# Patient Record
Sex: MALE | Race: WHITE | Employment: FULL TIME | URBAN - METROPOLITAN AREA
[De-identification: names, ages, dates, MRNs, and addresses within clinical notes are randomized per-mention and may not be internally consistent; named-entity substitution may affect disease eponyms.]

---

## 2017-02-21 ENCOUNTER — HOSPITAL ENCOUNTER (EMERGENCY)
Age: 27
Discharge: HOME | End: 2017-02-22
Attending: EMERGENCY MEDICINE
Payer: SELF-PAY
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
## 2017-02-21 PROCEDURE — 99283 EMERGENCY DEPT VISIT LOW MDM: CPT

## 2017-02-22 VITALS
OXYGEN SATURATION: 98 % | DIASTOLIC BLOOD PRESSURE: 78 MMHG | RESPIRATION RATE: 14 BRPM | HEART RATE: 76 BPM | SYSTOLIC BLOOD PRESSURE: 114 MMHG | TEMPERATURE: 98 F

## 2017-02-22 RX ORDER — KETOROLAC TROMETHAMINE 30 MG/ML
60 INJECTION, SOLUTION INTRAMUSCULAR; INTRAVENOUS
Status: DISCONTINUED | OUTPATIENT
Start: 2017-02-22 | End: 2017-02-22 | Stop reason: HOSPADM
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
## 2019-07-11 ENCOUNTER — HOSPITAL ENCOUNTER (EMERGENCY)
Age: 29
Discharge: HOME | End: 2019-07-12
Attending: EMERGENCY MEDICINE
Payer: SELF-PAY

## 2019-07-11 VITALS
OXYGEN SATURATION: 95 % | BODY MASS INDEX: 24.25 KG/M2 | TEMPERATURE: 98.2 F | RESPIRATION RATE: 16 BRPM | SYSTOLIC BLOOD PRESSURE: 140 MMHG | HEART RATE: 72 BPM | WEIGHT: 160 LBS | HEIGHT: 68 IN | DIASTOLIC BLOOD PRESSURE: 79 MMHG

## 2019-07-11 DIAGNOSIS — S61.217A LACERATION OF LEFT LITTLE FINGER WITHOUT FOREIGN BODY WITHOUT DAMAGE TO NAIL, INITIAL ENCOUNTER: Primary | ICD-10-CM

## 2019-07-11 PROCEDURE — 99282 EMERGENCY DEPT VISIT SF MDM: CPT

## 2019-07-12 PROCEDURE — 90471 IMMUNIZATION ADMIN: CPT

## 2019-07-12 PROCEDURE — 77030018836 HC SOL IRR NACL ICUM -A

## 2019-07-12 PROCEDURE — 74011000250 HC RX REV CODE- 250

## 2019-07-12 PROCEDURE — 75810000293 HC SIMP/SUPERF WND  RPR

## 2019-07-12 PROCEDURE — 77030031132 HC SUT NYL COVD -A

## 2019-07-12 PROCEDURE — 74011250636 HC RX REV CODE- 250/636: Performed by: EMERGENCY MEDICINE

## 2019-07-12 PROCEDURE — 90715 TDAP VACCINE 7 YRS/> IM: CPT | Performed by: EMERGENCY MEDICINE

## 2019-07-12 RX ORDER — BACITRACIN 500 UNIT/G
PACKET (EA) TOPICAL
Status: COMPLETED
Start: 2019-07-12 | End: 2019-07-12

## 2019-07-12 RX ADMIN — BACITRACIN ZINC: 500 OINTMENT TOPICAL at 00:22

## 2019-07-12 RX ADMIN — TETANUS TOXOID, REDUCED DIPHTHERIA TOXOID AND ACELLULAR PERTUSSIS VACCINE, ADSORBED 0.5 ML: 5; 2.5; 8; 8; 2.5 SUSPENSION INTRAMUSCULAR at 00:22

## 2019-07-12 NOTE — ED TRIAGE NOTES
Alert ambulatory male c/o laceration on left 5th digit with light bleeding. Pt stated pt was washing dishes and got lac by broken glass. Last tetanus unknown.

## 2019-07-12 NOTE — ED PROVIDER NOTES
TERA Pantoja is a 26 y.o. male ambulatory to ER c/o laceration on left 5th digit with light bleeding. Pt stated pt was washing dishes and got lac by broken glass, no broken glass missing (FB not suspected). Last tetanus unknown. History reviewed. No pertinent past medical history. Past Surgical History:   Procedure Laterality Date    HX ORTHOPAEDIC      finger         History reviewed. No pertinent family history. Social History     Socioeconomic History    Marital status: SINGLE     Spouse name: Not on file    Number of children: Not on file    Years of education: Not on file    Highest education level: Not on file   Occupational History    Not on file   Social Needs    Financial resource strain: Not on file    Food insecurity:     Worry: Not on file     Inability: Not on file    Transportation needs:     Medical: Not on file     Non-medical: Not on file   Tobacco Use    Smoking status: Current Every Day Smoker     Packs/day: 0.25   Substance and Sexual Activity    Alcohol use: No    Drug use: No    Sexual activity: Not on file   Lifestyle    Physical activity:     Days per week: Not on file     Minutes per session: Not on file    Stress: Not on file   Relationships    Social connections:     Talks on phone: Not on file     Gets together: Not on file     Attends Yazidism service: Not on file     Active member of club or organization: Not on file     Attends meetings of clubs or organizations: Not on file     Relationship status: Not on file    Intimate partner violence:     Fear of current or ex partner: Not on file     Emotionally abused: Not on file     Physically abused: Not on file     Forced sexual activity: Not on file   Other Topics Concern    Not on file   Social History Narrative    Not on file         ALLERGIES: Other medication    Review of Systems   Musculoskeletal: Negative for arthralgias and joint swelling.    Skin: Positive for wound (left 5th finger laceration). Neurological: Negative for dizziness. Hematological: Does not bruise/bleed easily. All other systems reviewed and are negative. Vitals:    07/11/19 2331   BP: 140/79   Pulse: 72   Resp: 16   Temp: 98.2 °F (36.8 °C)   SpO2: 95%   Weight: 72.6 kg (160 lb)   Height: 5' 8\" (1.727 m)            Physical Exam   Constitutional: He is oriented to person, place, and time. He appears well-developed and well-nourished. No distress. HENT:   Head: Normocephalic and atraumatic. Eyes: Conjunctivae are normal.   Neck: Normal range of motion. Neck supple. Cardiovascular: Intact distal pulses. Pulmonary/Chest: Effort normal. No respiratory distress. Neurological: He is alert and oriented to person, place, and time. Skin: Capillary refill takes less than 2 seconds. Left 5th finger ulnar aspect of middle and distal phalanges with flap lac of ~3.5 cm length, bleeding controlled with pressure, no FB, edges approximated wo difficulty, DIP and PIP with FROM, sensation and strength intact, nail not damaged   Psychiatric: He has a normal mood and affect. Nursing note and vitals reviewed.        MDM  Number of Diagnoses or Management Options  Laceration of left little finger without foreign body without damage to nail, initial encounter:   Diagnosis management comments: Finger lac repaired wo difficulty  No tendon-ligament damage  No FB  No contamination  Tetanus updated  Low risk for infection but risks discussed       Amount and/or Complexity of Data Reviewed  Tests in the radiology section of CPT®: (Not indicated)    Risk of Complications, Morbidity, and/or Mortality  Presenting problems: moderate  Diagnostic procedures: low  Management options: moderate    Patient Progress  Patient progress: improved         Wound Repair  Date/Time: 7/12/2019 12:42 AM  Performed by: PAPreparation: skin prepped with ChloraPrep  Pre-procedure re-eval: Immediately prior to the procedure, the patient was reevaluated and found suitable for the planned procedure and any planned medications. Time out: Immediately prior to the procedure a time out was called to verify the correct patient, procedure, equipment, staff and marking as appropriate. .  Location details: left small finger  Wound length:2.6 - 7.5 cm  Anesthesia: local infiltration    Anesthesia:  Local Anesthetic: lidocaine 1% without epinephrine  Anesthetic total: 1 mL  Foreign bodies: no foreign bodies  Irrigation solution: saline  Irrigation method: syringe  Debridement: minimal  Skin closure: 4-0 nylon  Number of sutures: 4  Technique: simple and interrupted  Dressing: antibiotic ointment and gauze roll  Patient tolerance: Patient tolerated the procedure well with no immediate complications  My total time at bedside, performing this procedure was 1-15 minutes. Medications ordered:   Medications   diph,Pertuss(AC),Tet Vac-PF (BOOSTRIX) suspension 0.5 mL (0.5 mL IntraMUSCular Given 7/12/19 0022)   bacitracin 500 unit/gram packet (  Given 7/12/19 0022)        Lab findings:   Labs Reviewed - No data to display     EKG interpretation:   EKG Results     None           X-Ray, CT or other radiology findings or impressions:   No orders to display              Diagnosis:   1. Laceration of left little finger without foreign body without damage to nail, initial encounter          Disposition: home    Follow-up Information     Follow up With Specialties Details Why [address] [facility] EMERGENCY DEPT Emergency Medicine In 8 days For suture removal, For wound re-check.  Return sooner for any concerns [address]  [phone]          Patient's Medications    No medications on file

## 2019-07-12 NOTE — DISCHARGE INSTRUCTIONS
INSTRUCTIONS FROM EMERGENCY ROOM PROVIDER:   4 sutures/staples were placed to repair your laceration. They need to be removed in 8-10 days. Keep area clean and dry. Apply over-the-counter antibiotic ointment to area a few times a day. Keep wound covered for 1 day following wound repair, make sure to expose it to air at least 3-4 times a day for 10-15 minutes at a time starting the next day after repair. Clean wound with soap and water.    You can return here or see your primary care provider if preferred for suture removal.